# Patient Record
Sex: MALE | Race: WHITE
[De-identification: names, ages, dates, MRNs, and addresses within clinical notes are randomized per-mention and may not be internally consistent; named-entity substitution may affect disease eponyms.]

---

## 2018-06-28 ENCOUNTER — HOSPITAL ENCOUNTER (EMERGENCY)
Dept: HOSPITAL 26 - MED | Age: 38
LOS: 1 days | Discharge: HOME | End: 2018-06-29
Payer: COMMERCIAL

## 2018-06-28 VITALS — SYSTOLIC BLOOD PRESSURE: 157 MMHG | DIASTOLIC BLOOD PRESSURE: 98 MMHG

## 2018-06-28 VITALS — HEIGHT: 64 IN | BODY MASS INDEX: 30.39 KG/M2 | WEIGHT: 178 LBS

## 2018-06-28 DIAGNOSIS — M50.93: Primary | ICD-10-CM

## 2018-06-28 PROCEDURE — 96372 THER/PROPH/DIAG INJ SC/IM: CPT

## 2018-06-28 PROCEDURE — 72125 CT NECK SPINE W/O DYE: CPT

## 2018-06-28 PROCEDURE — 99284 EMERGENCY DEPT VISIT MOD MDM: CPT

## 2018-06-28 PROCEDURE — 70450 CT HEAD/BRAIN W/O DYE: CPT

## 2018-06-28 NOTE — NUR
37/M CAME IN W C/O CONSTANT POSTERIOR HEADACHE X 3 DAYS. DENIES LOC, HEAD 
INJURY/ TRAUMA. REPORTS DIZZINESS, DENIES VISUAL DISTUBANCES, N/V. - TENDERNESS 
TO PALPATION TO NECK. DENIES PMH/RX, REPORTS HAS BEEN TAKING IBUPROFEN WITHOUT 
RELIEF, LAST DOSE AT 1400

## 2018-06-29 VITALS — SYSTOLIC BLOOD PRESSURE: 128 MMHG | DIASTOLIC BLOOD PRESSURE: 71 MMHG

## 2018-06-29 NOTE — NUR
Patient discharged with v/s stable. Written and verbal after care instructions 
given and explained. 

Patient alert, oriented and verbalized understanding of instructions. 
Ambulatory with steady gait. All questions addressed prior to discharge. ID 
band removed. Patient advised to follow up with PMD. Rx of FLEXERIL AND 
IBUPROFEN given. Patient educated on indication of medication including 
possible reaction and side effects. Opportunity to ask questions provided and 
answered.

## 2021-10-12 ENCOUNTER — HOSPITAL ENCOUNTER (EMERGENCY)
Dept: HOSPITAL 26 - MED | Age: 41
Discharge: HOME | End: 2021-10-12
Payer: COMMERCIAL

## 2021-10-12 VITALS — SYSTOLIC BLOOD PRESSURE: 148 MMHG | DIASTOLIC BLOOD PRESSURE: 103 MMHG

## 2021-10-12 VITALS — WEIGHT: 174 LBS | BODY MASS INDEX: 29.71 KG/M2 | HEIGHT: 64 IN

## 2021-10-12 VITALS — SYSTOLIC BLOOD PRESSURE: 156 MMHG | DIASTOLIC BLOOD PRESSURE: 96 MMHG

## 2021-10-12 DIAGNOSIS — R03.0: ICD-10-CM

## 2021-10-12 DIAGNOSIS — M54.2: Primary | ICD-10-CM

## 2021-10-12 DIAGNOSIS — R42: ICD-10-CM

## 2021-10-12 PROCEDURE — 96372 THER/PROPH/DIAG INJ SC/IM: CPT

## 2021-10-12 PROCEDURE — 99283 EMERGENCY DEPT VISIT LOW MDM: CPT

## 2021-10-12 PROCEDURE — 93005 ELECTROCARDIOGRAM TRACING: CPT

## 2021-10-12 NOTE — NUR
Patient discharged with v/s stable. Written and verbal after care instructions 
given and explained. 

Patient alert, oriented and verbalized understanding of instructions. 
Ambulatory with steady gait. All questions addressed prior to discharge. ID 
band removed. Patient advised to follow up with PMD. Rx of LIDOCAINE HCL, 
CUCLOBENZAPRINE HCL, IBUPROFEN given. Patient educated on indication of 
medication including possible reaction and side effects. Opportunity to ask 
questions provided and answered.

## 2021-10-12 NOTE — NUR
42 YO MALE BIBS WITH C/O 6/10 RIGHT NECK PAIN RADIATING TO RIGHT SHOULDER , HA 
X 3 DAYS AND C/O MID BACK PAIN X 2 DAYS. DENIES CP, SOB, TRAUMA/INJURY, N/V/D. 
PATIENT STATES HE HAD SIMILIAR PAIN 2 YEARS AGO BUT WENT AWAY. A&OX4, PLACED ON 
MONITOR FOR FURTHER EVAL. 



PMH: DENIES

NKDA